# Patient Record
Sex: FEMALE | Race: WHITE | NOT HISPANIC OR LATINO | Employment: FULL TIME | RURAL
[De-identification: names, ages, dates, MRNs, and addresses within clinical notes are randomized per-mention and may not be internally consistent; named-entity substitution may affect disease eponyms.]

---

## 2018-02-06 ENCOUNTER — HISTORICAL (OUTPATIENT)
Dept: ADMINISTRATIVE | Facility: HOSPITAL | Age: 58
End: 2018-02-06

## 2018-02-08 LAB
LAB AP GENERAL CAT - HISTORICAL: NORMAL
LAB AP INTERPRETATION/RESULT - HISTORICAL: NEGATIVE
LAB AP SPECIMEN ADEQUACY - HISTORICAL: NORMAL
LAB AP SPECIMEN SUBMITTED - HISTORICAL: NORMAL

## 2019-04-02 ENCOUNTER — HISTORICAL (OUTPATIENT)
Dept: ADMINISTRATIVE | Facility: HOSPITAL | Age: 59
End: 2019-04-02

## 2023-10-02 ENCOUNTER — OFFICE VISIT (OUTPATIENT)
Dept: PULMONOLOGY | Facility: CLINIC | Age: 63
End: 2023-10-02
Payer: COMMERCIAL

## 2023-10-02 VITALS
HEART RATE: 89 BPM | BODY MASS INDEX: 34.15 KG/M2 | RESPIRATION RATE: 20 BRPM | SYSTOLIC BLOOD PRESSURE: 136 MMHG | DIASTOLIC BLOOD PRESSURE: 60 MMHG | OXYGEN SATURATION: 97 % | WEIGHT: 200 LBS | HEIGHT: 64 IN

## 2023-10-02 DIAGNOSIS — R59.0 MEDIASTINAL LYMPHADENOPATHY: Primary | ICD-10-CM

## 2023-10-02 DIAGNOSIS — C64.2 RENAL CELL CARCINOMA OF LEFT KIDNEY: ICD-10-CM

## 2023-10-02 PROBLEM — E55.9 VITAMIN D DEFICIENCY, UNSPECIFIED: Status: ACTIVE | Noted: 2023-10-02

## 2023-10-02 PROBLEM — E78.5 HYPERLIPIDEMIA LDL GOAL <100: Status: ACTIVE | Noted: 2023-10-02

## 2023-10-02 PROBLEM — C50.911 MALIGNANT NEOPLASM OF RIGHT FEMALE BREAST: Status: ACTIVE | Noted: 2023-10-02

## 2023-10-02 PROBLEM — I10 ESSENTIAL HYPERTENSION: Status: ACTIVE | Noted: 2023-10-02

## 2023-10-02 PROBLEM — E66.9 OBESITY: Status: ACTIVE | Noted: 2023-10-02

## 2023-10-02 PROCEDURE — 1160F PR REVIEW ALL MEDS BY PRESCRIBER/CLIN PHARMACIST DOCUMENTED: ICD-10-PCS | Mod: S$GLB,,, | Performed by: STUDENT IN AN ORGANIZED HEALTH CARE EDUCATION/TRAINING PROGRAM

## 2023-10-02 PROCEDURE — 99204 OFFICE O/P NEW MOD 45 MIN: CPT | Mod: S$GLB,,, | Performed by: STUDENT IN AN ORGANIZED HEALTH CARE EDUCATION/TRAINING PROGRAM

## 2023-10-02 PROCEDURE — 3078F PR MOST RECENT DIASTOLIC BLOOD PRESSURE < 80 MM HG: ICD-10-PCS | Mod: S$GLB,,, | Performed by: STUDENT IN AN ORGANIZED HEALTH CARE EDUCATION/TRAINING PROGRAM

## 2023-10-02 PROCEDURE — 3008F PR BODY MASS INDEX (BMI) DOCUMENTED: ICD-10-PCS | Mod: S$GLB,,, | Performed by: STUDENT IN AN ORGANIZED HEALTH CARE EDUCATION/TRAINING PROGRAM

## 2023-10-02 PROCEDURE — 1160F RVW MEDS BY RX/DR IN RCRD: CPT | Mod: S$GLB,,, | Performed by: STUDENT IN AN ORGANIZED HEALTH CARE EDUCATION/TRAINING PROGRAM

## 2023-10-02 PROCEDURE — 1159F PR MEDICATION LIST DOCUMENTED IN MEDICAL RECORD: ICD-10-PCS | Mod: S$GLB,,, | Performed by: STUDENT IN AN ORGANIZED HEALTH CARE EDUCATION/TRAINING PROGRAM

## 2023-10-02 PROCEDURE — 99204 PR OFFICE/OUTPT VISIT, NEW, LEVL IV, 45-59 MIN: ICD-10-PCS | Mod: S$GLB,,, | Performed by: STUDENT IN AN ORGANIZED HEALTH CARE EDUCATION/TRAINING PROGRAM

## 2023-10-02 PROCEDURE — 4010F ACE/ARB THERAPY RXD/TAKEN: CPT | Mod: S$GLB,,, | Performed by: STUDENT IN AN ORGANIZED HEALTH CARE EDUCATION/TRAINING PROGRAM

## 2023-10-02 PROCEDURE — 3075F SYST BP GE 130 - 139MM HG: CPT | Mod: S$GLB,,, | Performed by: STUDENT IN AN ORGANIZED HEALTH CARE EDUCATION/TRAINING PROGRAM

## 2023-10-02 PROCEDURE — 1159F MED LIST DOCD IN RCRD: CPT | Mod: S$GLB,,, | Performed by: STUDENT IN AN ORGANIZED HEALTH CARE EDUCATION/TRAINING PROGRAM

## 2023-10-02 PROCEDURE — 4010F PR ACE/ARB THEARPY RXD/TAKEN: ICD-10-PCS | Mod: S$GLB,,, | Performed by: STUDENT IN AN ORGANIZED HEALTH CARE EDUCATION/TRAINING PROGRAM

## 2023-10-02 PROCEDURE — 3008F BODY MASS INDEX DOCD: CPT | Mod: S$GLB,,, | Performed by: STUDENT IN AN ORGANIZED HEALTH CARE EDUCATION/TRAINING PROGRAM

## 2023-10-02 PROCEDURE — 99215 OFFICE O/P EST HI 40 MIN: CPT | Mod: PBBFAC | Performed by: STUDENT IN AN ORGANIZED HEALTH CARE EDUCATION/TRAINING PROGRAM

## 2023-10-02 PROCEDURE — 3078F DIAST BP <80 MM HG: CPT | Mod: S$GLB,,, | Performed by: STUDENT IN AN ORGANIZED HEALTH CARE EDUCATION/TRAINING PROGRAM

## 2023-10-02 PROCEDURE — 3075F PR MOST RECENT SYSTOLIC BLOOD PRESS GE 130-139MM HG: ICD-10-PCS | Mod: S$GLB,,, | Performed by: STUDENT IN AN ORGANIZED HEALTH CARE EDUCATION/TRAINING PROGRAM

## 2023-10-02 RX ORDER — AMLODIPINE AND BENAZEPRIL HYDROCHLORIDE 5; 20 MG/1; MG/1
1 CAPSULE ORAL
COMMUNITY
Start: 2023-07-27

## 2023-10-02 RX ORDER — ASPIRIN 81 MG/1
81 TABLET ORAL DAILY
COMMUNITY

## 2023-10-02 RX ORDER — EPINEPHRINE 0.22MG
AEROSOL WITH ADAPTER (ML) INHALATION
COMMUNITY

## 2023-10-02 RX ORDER — FOLIC ACID 0.8 MG
TABLET ORAL
COMMUNITY

## 2023-10-02 RX ORDER — FERROUS SULFATE 325(65) MG
TABLET ORAL
COMMUNITY

## 2023-10-02 RX ORDER — ACETAMINOPHEN 500 MG
TABLET ORAL
COMMUNITY

## 2023-10-02 RX ORDER — CALCIUM CARBONATE/VITAMIN D3 600 MG-20
TABLET,CHEWABLE ORAL
COMMUNITY

## 2023-10-02 RX ORDER — ROSUVASTATIN CALCIUM 10 MG/1
10 TABLET, COATED ORAL
COMMUNITY
Start: 2023-09-29

## 2023-10-02 NOTE — PATIENT INSTRUCTIONS
LAB TEST REQUESTED:  Please perform a CBC and BMP this week and fax the results to the clinic.     Check in on GROUND FLOOR of Ambulatory building.      Dont eat or drink  Follow any directions you are given for not eating or drinking before surgery. If you don't follow instructions about when to stop eating and drinking, your procedure may be postponed or rescheduled for another day. This is a safety issue.  You can brush your teeth and rinse your mouth, but dont swallow any water.    Day of surgery  Leave jewelry (including rings), watches, and other valuables at home.  Be sure to bring health insurance cards or forms, and a photo ID.  Bring a list of your medicines (include the name, dose, how often you take them, and the time last dose was taken).  You will be sedated for the procedure so you must have a  present with you.  Arrive on time at the hospital or surgery facility.        Flexible Bronchoscopy  A flexible bronchoscopy is an exam of the airways of your lungs. A thin, flexible tube called a bronchoscope is used. It has a light and small camera that allow the healthcare provider to view your airways.    Before your test  Follow your healthcare provider's instructions carefully. If you dont, the exam may be canceled. Or you may need to take it again.  If you are taking blood-thinning medicine, ask your healthcare provider if you should stop taking the medicine before this test.  Have no food or drink for at least 8 hours before the test. Also, avoid smoking for 24 hours before the test.  You will need to remove any dentures or removable devices from your mouth.  Right before the test, you will be given sedating medicines to help you relax. The medicine may be given by an IV (intravenously) into one of your veins. In addition, your nose and throat may be numbed with a special spray to help prevent gagging and coughing.  If you are having this test as an outpatient, make sure you have an adult  friend or family member to drive you home.    During your test  Bronchoscopy takes 45 to 60 minutes (**EBUS takes longer**) and includes the following steps:  You may be given medicine (anesthesia) so that you are unconscious or asleep during the procedure.  The healthcare provider inserts the tube into your nose or mouth.  If you have not been given anesthesia, you might feel a gagging sensation. To help ease this feeling, you will be told to swallow or take deep breaths. Your airway will remain open even with the tube in place. But you wont be able to talk.  The provider checks your breathing passage. He or she may also remove tiny tissue samples for biopsy.  After your test  You may have a mild sore throat or cough. Your voice may also be hoarse.  Don't eat or drink until the anesthesia wears off.  If you had a biopsy, you might see traces of blood being coughed up.  When to call your healthcare provider  Call your healthcare provider right away if you have any of the following:  Shortness of breath  Chest pain  Bleeding from your nose or throat  Coughing up a large amount of blood  A fever above 100.4°F (38°C) for more than 24 hours  Call 911  Call 911 if you have:  Chest pain  Severe shortness of breath

## 2023-10-02 NOTE — PROGRESS NOTES
[facility]  Pulmonology  NEW VISIT     Patient Name:  Xochilt Morel  Primary Care Provider: Levi Shook DO  Date of Service: 10/02/2023  Reason for Referral: Abnormal CT Chest      Chief Complaint: abnormal CT Chest    SUBJECTIVE   HPI:  Xochilt Morel is a 62 y.o. female with newly diagnosed RCC (L kidney) s/p resection 2023 with CT imaging demonstrating paratracheal, paraaortic and hilar lymphadenopathy who presents today upon referral with no acute complaints.     Adriel reports feeling well on this evaluation. She was recently diagnosed with renal cell cancer s/p resection. She has had no respiratory symptoms and is a lifelong non smoker. Her post-operative course was uncomplicated and she was able to return to work after 4 days post procedure. She denies fever, chills or cough. She has had TB screening with T spot testing (skin test) that has been negative.       Past Medical History:   Diagnosis Date    Essential (primary) hypertension     H/O right mastectomy 2011    High cholesterol     History of kidney cancer     removed 23    Renal cell carcinoma of left kidney 10/2/2023       Past Surgical History:   Procedure Laterality Date     SECTION Bilateral     x2       Family History   Problem Relation Age of Onset    Diabetes Other         Social History     Socioeconomic History    Marital status: Unknown   Tobacco Use    Smoking status: Never    Smokeless tobacco: Never   Substance and Sexual Activity    Alcohol use: Never    Drug use: Never    Sexual activity: Yes     Partners: Male   Social History Narrative    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       Review of patient's allergies indicates:   Allergen Reactions    Sulfa (sulfonamide antibiotics)      Other reaction(s): Rash, Itchy, Unknown        Medications:  "Medications reviewed to include over the counter medications.    Review of Systems: A 10 point ROS was completed and found to be negative except for that mentioned above.      OBJECTIVE   PHYSICAL EXAM:  Vitals:    10/02/23 1447   BP: 136/60   BP Location: Left arm   Patient Position: Sitting   BP Method: Large (Automatic)   Pulse: 89   Resp: 20   SpO2: 97%   Weight: 90.7 kg (200 lb)   Height: 5' 4" (1.626 m)        GENERAL: NAD  RESPIRATORY: clear to auscultation, no wheezing, rales or rhonchi  CARDIOVASCULAR: Regular rate and rhythm, no murmurs rubs or gallops. MUSCULOSKELETAL: No clubbing or cyanosis; no pedal edema  NEUROLOGIC: AO ×3, no gross deficits  PSYCH: Normal mood and affect    LABS:  Care Everywhere results reviewed 07/2023 CBC wnl (no anemia or thrombocytopenia), Scr 0.8 and no electrolyte derangements.    IMAGING:  Imaging reviewed and notable for CT Chest 09/01/2023 with pretracheal, subcarinal and L hilar lymphadenopathy and sub-centimeter nodules w/ solid component in LLL, R upper,middle and lower lung lobes.      LUNG FUNCTION TESTING: None available to review or report      ASSESSMENT & PLAN     1. Mediastinal lymphadenopathy  Assessment & Plan:  63 yo F with recent hx of RCC s/p resection (09/2023) and remote hx of breast cancer s/p R mastectomy presents upon referral by her Oncology care team (Dr. Magallanes) for CT Chest with mediastinal and hilar adenopathy along with sub-centimeter nodules. No ongoing respiratory symptoms. Lifelong non smoker. Ddx broad and most likely LAD 2/2 granulomatous disease; given oncologic history we discussed diagnosis with bronchoscopy with EBUS for biopsy of which she is amenable.   - plan for EBUS, pending scheduling; discussed procedure at length including risk of pneumothorax, bleeding and infection/pneumonia  - to obtain CBC and BMP and fax results to this facility  - ASA ok on day of procedure; not on any anticoagulation therapy    Orders:  -     EBUS; Future; " Expected date: 10/02/2023       Follow up for Pending scheduling of EBUS.      Case was discussed with patient and family members; all questions were answered to patient's satisfaction and patient verbalized understanding.     Kathy Lopez MD  Pulmonary Medicine  [facility]  Phone: [phone]

## 2023-10-02 NOTE — H&P (VIEW-ONLY)
[facility]  Pulmonology  NEW VISIT     Patient Name:  Xochilt Morel  Primary Care Provider: Levi Shook DO  Date of Service: 10/02/2023  Reason for Referral: Abnormal CT Chest      Chief Complaint: abnormal CT Chest    SUBJECTIVE   HPI:  Xochilt Morel is a 62 y.o. female with newly diagnosed RCC (L kidney) s/p resection 2023 with CT imaging demonstrating paratracheal, paraaortic and hilar lymphadenopathy who presents today upon referral with no acute complaints.     Adriel reports feeling well on this evaluation. She was recently diagnosed with renal cell cancer s/p resection. She has had no respiratory symptoms and is a lifelong non smoker. Her post-operative course was uncomplicated and she was able to return to work after 4 days post procedure. She denies fever, chills or cough. She has had TB screening with T spot testing (skin test) that has been negative.       Past Medical History:   Diagnosis Date    Essential (primary) hypertension     H/O right mastectomy 2011    High cholesterol     History of kidney cancer     removed 22    Renal cell carcinoma of left kidney 10/2/2023       Past Surgical History:   Procedure Laterality Date     SECTION Bilateral     x2       Family History   Problem Relation Age of Onset    Diabetes Other         Social History     Socioeconomic History    Marital status: Unknown   Tobacco Use    Smoking status: Never    Smokeless tobacco: Never   Substance and Sexual Activity    Alcohol use: Never    Drug use: Never    Sexual activity: Yes     Partners: Male   Social History Narrative    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       Review of patient's allergies indicates:   Allergen Reactions    Sulfa (sulfonamide antibiotics)      Other reaction(s): Rash, Itchy, Unknown        Medications:  "Medications reviewed to include over the counter medications.    Review of Systems: A 10 point ROS was completed and found to be negative except for that mentioned above.      OBJECTIVE   PHYSICAL EXAM:  Vitals:    10/02/23 1447   BP: 136/60   BP Location: Left arm   Patient Position: Sitting   BP Method: Large (Automatic)   Pulse: 89   Resp: 20   SpO2: 97%   Weight: 90.7 kg (200 lb)   Height: 5' 4" (1.626 m)        GENERAL: NAD  RESPIRATORY: clear to auscultation, no wheezing, rales or rhonchi  CARDIOVASCULAR: Regular rate and rhythm, no murmurs rubs or gallops. MUSCULOSKELETAL: No clubbing or cyanosis; no pedal edema  NEUROLOGIC: AO ×3, no gross deficits  PSYCH: Normal mood and affect    LABS:  Care Everywhere results reviewed 07/2023 CBC wnl (no anemia or thrombocytopenia), Scr 0.8 and no electrolyte derangements.    IMAGING:  Imaging reviewed and notable for CT Chest 09/01/2023 with pretracheal, subcarinal and L hilar lymphadenopathy and sub-centimeter nodules w/ solid component in LLL, R upper,middle and lower lung lobes.      LUNG FUNCTION TESTING: None available to review or report      ASSESSMENT & PLAN     1. Mediastinal lymphadenopathy  Assessment & Plan:  63 yo F with recent hx of RCC s/p resection (09/2023) and remote hx of breast cancer s/p R mastectomy presents upon referral by her Oncology care team (Dr. Magallanes) for CT Chest with mediastinal and hilar adenopathy along with sub-centimeter nodules. No ongoing respiratory symptoms. Lifelong non smoker. Ddx broad and most likely LAD 2/2 granulomatous disease; given oncologic history we discussed diagnosis with bronchoscopy with EBUS for biopsy of which she is amenable.   - plan for EBUS, pending scheduling; discussed procedure at length including risk of pneumothorax, bleeding and infection/pneumonia  - to obtain CBC and BMP and fax results to this facility  - ASA ok on day of procedure; not on any anticoagulation therapy    Orders:  -     EBUS; Future; " Expected date: 10/02/2023       Follow up for Pending scheduling of EBUS.      Case was discussed with patient and family members; all questions were answered to patient's satisfaction and patient verbalized understanding.     Kathy Lopez MD  Pulmonary Medicine  [facility]  Phone: [phone]

## 2023-10-03 NOTE — ASSESSMENT & PLAN NOTE
63 yo F with recent hx of RCC s/p resection (09/2023) and remote hx of breast cancer s/p R mastectomy presents upon referral by her Oncology care team (Dr. Magallanes) for CT Chest with mediastinal and hilar adenopathy along with sub-centimeter nodules. No ongoing respiratory symptoms. Lifelong non smoker. Ddx broad and most likely LAD 2/2 granulomatous disease; given oncologic history we discussed diagnosis with bronchoscopy with EBUS for biopsy of which she is amenable.   - plan for EBUS, pending scheduling; discussed procedure at length including risk of pneumothorax, bleeding and infection/pneumonia  - to obtain CBC and BMP and fax results to this facility  - ASA ok on day of procedure; not on any anticoagulation therapy

## 2023-10-06 ENCOUNTER — TELEPHONE (OUTPATIENT)
Dept: PULMONOLOGY | Facility: CLINIC | Age: 63
End: 2023-10-06
Payer: COMMERCIAL

## 2023-10-06 NOTE — TELEPHONE ENCOUNTER
----- Message from Liza Murillo sent at 10/6/2023  8:39 AM CDT -----    Patient Returning Call        Who Called:pt  Does the patient know what this is regarding?:pt need lab order faxed to [phone] so she can get labs today pt work at a doctors office please have nurse to call if any questions  Would the patient rather a call back or a response via [facility]? call  Best Call Back Number:[phone] fax lab orders to pt   Additional Information: call back

## 2023-10-09 ENCOUNTER — HOSPITAL ENCOUNTER (OUTPATIENT)
Dept: RADIOLOGY | Facility: HOSPITAL | Age: 63
Discharge: HOME | End: 2023-10-09
Attending: STUDENT IN AN ORGANIZED HEALTH CARE EDUCATION/TRAINING PROGRAM
Payer: COMMERCIAL

## 2023-10-09 ENCOUNTER — HOSPITAL ENCOUNTER (OUTPATIENT)
Dept: GASTROENTEROLOGY | Facility: HOSPITAL | Age: 63
Discharge: HOME | End: 2023-10-09
Attending: STUDENT IN AN ORGANIZED HEALTH CARE EDUCATION/TRAINING PROGRAM
Payer: COMMERCIAL

## 2023-10-09 ENCOUNTER — ANESTHESIA (OUTPATIENT)
Dept: GASTROENTEROLOGY | Facility: HOSPITAL | Age: 63
End: 2023-10-09
Payer: COMMERCIAL

## 2023-10-09 ENCOUNTER — ANESTHESIA EVENT (OUTPATIENT)
Dept: GASTROENTEROLOGY | Facility: HOSPITAL | Age: 63
End: 2023-10-09
Payer: COMMERCIAL

## 2023-10-09 VITALS
OXYGEN SATURATION: 97 % | RESPIRATION RATE: 16 BRPM | DIASTOLIC BLOOD PRESSURE: 70 MMHG | TEMPERATURE: 98 F | SYSTOLIC BLOOD PRESSURE: 136 MMHG | HEART RATE: 86 BPM

## 2023-10-09 DIAGNOSIS — I10 HYPERTENSION: ICD-10-CM

## 2023-10-09 DIAGNOSIS — R59.0 MEDIASTINAL LYMPHADENOPATHY: ICD-10-CM

## 2023-10-09 LAB
AFB SMEAR (FA): NORMAL
DIRECT PREP: NORMAL

## 2023-10-09 PROCEDURE — 87210 SMEAR WET MOUNT SALINE/INK: CPT | Performed by: STUDENT IN AN ORGANIZED HEALTH CARE EDUCATION/TRAINING PROGRAM

## 2023-10-09 PROCEDURE — 31653 BRONCH EBUS SAMPLNG 3/> NODE: CPT

## 2023-10-09 PROCEDURE — 88172 CYTOLOGY, FNA: ICD-10-PCS | Mod: 26,,, | Performed by: PATHOLOGY

## 2023-10-09 PROCEDURE — 27201423 OPTIME MED/SURG SUP & DEVICES STERILE SUPPLY

## 2023-10-09 PROCEDURE — 31624 DX BRONCHOSCOPE/LAVAGE: CPT

## 2023-10-09 PROCEDURE — 27201480 HC GLIDESCOPE: Performed by: ANESTHESIOLOGY

## 2023-10-09 PROCEDURE — 37000009 HC ANESTHESIA EA ADD 15 MINS

## 2023-10-09 PROCEDURE — 88312 SPECIAL STAINS GROUP 1: CPT | Mod: 26,,, | Performed by: PATHOLOGY

## 2023-10-09 PROCEDURE — 88172 CYTP DX EVAL FNA 1ST EA SITE: CPT | Mod: 26,,, | Performed by: PATHOLOGY

## 2023-10-09 PROCEDURE — 31624 PR BRONCHOSCOPY,DIAG2STIC W LAVAGE: ICD-10-PCS | Mod: 59,RT,ICN, | Performed by: STUDENT IN AN ORGANIZED HEALTH CARE EDUCATION/TRAINING PROGRAM

## 2023-10-09 PROCEDURE — 27000655: Performed by: ANESTHESIOLOGY

## 2023-10-09 PROCEDURE — 88305 CYTOLOGY, FNA: ICD-10-PCS | Mod: 26,,, | Performed by: PATHOLOGY

## 2023-10-09 PROCEDURE — 31645 PR BRONCHOSCOPY,RX ASPIR PULM TREE: ICD-10-PCS | Mod: ICN,,, | Performed by: STUDENT IN AN ORGANIZED HEALTH CARE EDUCATION/TRAINING PROGRAM

## 2023-10-09 PROCEDURE — 37000008 HC ANESTHESIA 1ST 15 MINUTES

## 2023-10-09 PROCEDURE — 31622 DX BRONCHOSCOPE/WASH: CPT

## 2023-10-09 PROCEDURE — 87102 FUNGUS ISOLATION CULTURE: CPT | Performed by: STUDENT IN AN ORGANIZED HEALTH CARE EDUCATION/TRAINING PROGRAM

## 2023-10-09 PROCEDURE — 88108 CYTOPATH CONCENTRATE TECH: CPT | Mod: 26,,, | Performed by: PATHOLOGY

## 2023-10-09 PROCEDURE — D9220A PRA ANESTHESIA: Mod: ANES,,, | Performed by: ANESTHESIOLOGY

## 2023-10-09 PROCEDURE — 71045 X-RAY EXAM CHEST 1 VIEW: CPT | Mod: TC

## 2023-10-09 PROCEDURE — 93010 ELECTROCARDIOGRAM REPORT: CPT | Mod: ,,, | Performed by: HOSPITALIST

## 2023-10-09 PROCEDURE — 88305 TISSUE EXAM BY PATHOLOGIST: CPT | Mod: 26,,, | Performed by: PATHOLOGY

## 2023-10-09 PROCEDURE — D9220A PRA ANESTHESIA: ICD-10-PCS | Mod: CRNA,,, | Performed by: NURSE ANESTHETIST, CERTIFIED REGISTERED

## 2023-10-09 PROCEDURE — 27000689 HC BLADE LARYNGOSCOPE ANY SIZE: Performed by: ANESTHESIOLOGY

## 2023-10-09 PROCEDURE — 31624 DX BRONCHOSCOPE/LAVAGE: CPT | Mod: 51,,, | Performed by: STUDENT IN AN ORGANIZED HEALTH CARE EDUCATION/TRAINING PROGRAM

## 2023-10-09 PROCEDURE — D9220A PRA ANESTHESIA: ICD-10-PCS | Mod: ANES,,, | Performed by: ANESTHESIOLOGY

## 2023-10-09 PROCEDURE — 31653 BRONCH EBUS SAMPLNG 3/> NODE: CPT | Mod: ,,, | Performed by: STUDENT IN AN ORGANIZED HEALTH CARE EDUCATION/TRAINING PROGRAM

## 2023-10-09 PROCEDURE — 27000716 HC OXISENSOR PROBE, ANY SIZE: Performed by: ANESTHESIOLOGY

## 2023-10-09 PROCEDURE — 87107 FUNGI IDENTIFICATION MOLD: CPT | Mod: 90 | Performed by: STUDENT IN AN ORGANIZED HEALTH CARE EDUCATION/TRAINING PROGRAM

## 2023-10-09 PROCEDURE — 71045 X-RAY EXAM CHEST 1 VIEW: CPT | Mod: 26,,, | Performed by: RADIOLOGY

## 2023-10-09 PROCEDURE — 87070 CULTURE OTHR SPECIMN AEROBIC: CPT | Performed by: STUDENT IN AN ORGANIZED HEALTH CARE EDUCATION/TRAINING PROGRAM

## 2023-10-09 PROCEDURE — 87206 SMEAR FLUORESCENT/ACID STAI: CPT | Performed by: STUDENT IN AN ORGANIZED HEALTH CARE EDUCATION/TRAINING PROGRAM

## 2023-10-09 PROCEDURE — 93010 EKG 12-LEAD: ICD-10-PCS | Mod: ,,, | Performed by: HOSPITALIST

## 2023-10-09 PROCEDURE — 63600175 PHARM REV CODE 636 W HCPCS: Performed by: NURSE ANESTHETIST, CERTIFIED REGISTERED

## 2023-10-09 PROCEDURE — 25000003 PHARM REV CODE 250: Performed by: NURSE ANESTHETIST, CERTIFIED REGISTERED

## 2023-10-09 PROCEDURE — 71045 XR CHEST AP PORTABLE: ICD-10-PCS | Mod: 26,,, | Performed by: RADIOLOGY

## 2023-10-09 PROCEDURE — 27000510 HC BLANKET BAIR HUGGER ANY SIZE: Performed by: ANESTHESIOLOGY

## 2023-10-09 PROCEDURE — 93005 ELECTROCARDIOGRAM TRACING: CPT

## 2023-10-09 PROCEDURE — 87116 MYCOBACTERIA CULTURE: CPT | Performed by: STUDENT IN AN ORGANIZED HEALTH CARE EDUCATION/TRAINING PROGRAM

## 2023-10-09 PROCEDURE — 31645 BRNCHSC W/THER ASPIR 1ST: CPT

## 2023-10-09 PROCEDURE — 31645 BRNCHSC W/THER ASPIR 1ST: CPT | Mod: ,,, | Performed by: STUDENT IN AN ORGANIZED HEALTH CARE EDUCATION/TRAINING PROGRAM

## 2023-10-09 PROCEDURE — 88108 NON-GYN CYTOLOGY: ICD-10-PCS | Mod: 26,,, | Performed by: PATHOLOGY

## 2023-10-09 PROCEDURE — 87205 SMEAR GRAM STAIN: CPT | Performed by: STUDENT IN AN ORGANIZED HEALTH CARE EDUCATION/TRAINING PROGRAM

## 2023-10-09 PROCEDURE — 88305 TISSUE EXAM BY PATHOLOGIST: CPT | Mod: TC,MCY | Performed by: STUDENT IN AN ORGANIZED HEALTH CARE EDUCATION/TRAINING PROGRAM

## 2023-10-09 PROCEDURE — 31653 PR BRONCH W/ EBUS, SAMPLING 3 OR MORE NODES, INCL GUIDE: ICD-10-PCS | Mod: ICN,,, | Performed by: STUDENT IN AN ORGANIZED HEALTH CARE EDUCATION/TRAINING PROGRAM

## 2023-10-09 PROCEDURE — 88312 CYTOLOGY, FNA: ICD-10-PCS | Mod: 26,,, | Performed by: PATHOLOGY

## 2023-10-09 PROCEDURE — 27000165 HC TUBE, ETT CUFFED: Performed by: ANESTHESIOLOGY

## 2023-10-09 PROCEDURE — D9220A PRA ANESTHESIA: Mod: CRNA,,, | Performed by: NURSE ANESTHETIST, CERTIFIED REGISTERED

## 2023-10-09 RX ORDER — MEPERIDINE HYDROCHLORIDE 25 MG/ML
25 INJECTION INTRAMUSCULAR; INTRAVENOUS; SUBCUTANEOUS EVERY 10 MIN PRN
Status: DISCONTINUED | OUTPATIENT
Start: 2023-10-09 | End: 2023-10-09 | Stop reason: HOSPADM

## 2023-10-09 RX ORDER — PHENYLEPHRINE HYDROCHLORIDE 10 MG/ML
INJECTION INTRAVENOUS
Status: DISCONTINUED | OUTPATIENT
Start: 2023-10-09 | End: 2023-10-09

## 2023-10-09 RX ORDER — SODIUM CHLORIDE 9 MG/ML
INJECTION, SOLUTION INTRAVENOUS
Status: COMPLETED
Start: 2023-10-09 | End: 2023-10-09

## 2023-10-09 RX ORDER — IPRATROPIUM BROMIDE AND ALBUTEROL SULFATE 2.5; .5 MG/3ML; MG/3ML
3 SOLUTION RESPIRATORY (INHALATION) ONCE
Status: DISCONTINUED | OUTPATIENT
Start: 2023-10-09 | End: 2023-10-10 | Stop reason: HOSPADM

## 2023-10-09 RX ORDER — ROCURONIUM BROMIDE 10 MG/ML
INJECTION, SOLUTION INTRAVENOUS
Status: DISCONTINUED | OUTPATIENT
Start: 2023-10-09 | End: 2023-10-09

## 2023-10-09 RX ORDER — ONDANSETRON 2 MG/ML
4 INJECTION INTRAMUSCULAR; INTRAVENOUS DAILY PRN
Status: DISCONTINUED | OUTPATIENT
Start: 2023-10-09 | End: 2023-10-10 | Stop reason: HOSPADM

## 2023-10-09 RX ORDER — HYDROMORPHONE HYDROCHLORIDE 2 MG/ML
0.5 INJECTION, SOLUTION INTRAMUSCULAR; INTRAVENOUS; SUBCUTANEOUS EVERY 5 MIN PRN
Status: DISCONTINUED | OUTPATIENT
Start: 2023-10-09 | End: 2023-10-10 | Stop reason: HOSPADM

## 2023-10-09 RX ORDER — MIDAZOLAM HYDROCHLORIDE 1 MG/ML
INJECTION INTRAMUSCULAR; INTRAVENOUS
Status: DISCONTINUED | OUTPATIENT
Start: 2023-10-09 | End: 2023-10-09

## 2023-10-09 RX ORDER — FENTANYL CITRATE 50 UG/ML
INJECTION, SOLUTION INTRAMUSCULAR; INTRAVENOUS
Status: DISCONTINUED | OUTPATIENT
Start: 2023-10-09 | End: 2023-10-09

## 2023-10-09 RX ORDER — DIPHENHYDRAMINE HYDROCHLORIDE 50 MG/ML
25 INJECTION INTRAMUSCULAR; INTRAVENOUS EVERY 6 HOURS PRN
Status: DISCONTINUED | OUTPATIENT
Start: 2023-10-09 | End: 2023-10-10 | Stop reason: HOSPADM

## 2023-10-09 RX ORDER — PROPOFOL 10 MG/ML
VIAL (ML) INTRAVENOUS
Status: DISCONTINUED | OUTPATIENT
Start: 2023-10-09 | End: 2023-10-09

## 2023-10-09 RX ORDER — SCOLOPAMINE TRANSDERMAL SYSTEM 1 MG/1
PATCH, EXTENDED RELEASE TRANSDERMAL
Status: DISCONTINUED | OUTPATIENT
Start: 2023-10-09 | End: 2023-10-09

## 2023-10-09 RX ORDER — ONDANSETRON 2 MG/ML
INJECTION INTRAMUSCULAR; INTRAVENOUS
Status: DISCONTINUED | OUTPATIENT
Start: 2023-10-09 | End: 2023-10-09

## 2023-10-09 RX ORDER — MORPHINE SULFATE 10 MG/ML
4 INJECTION INTRAMUSCULAR; INTRAVENOUS; SUBCUTANEOUS EVERY 5 MIN PRN
Status: DISCONTINUED | OUTPATIENT
Start: 2023-10-09 | End: 2023-10-10 | Stop reason: HOSPADM

## 2023-10-09 RX ORDER — SODIUM CHLORIDE 9 MG/ML
INJECTION, SOLUTION INTRAVENOUS CONTINUOUS
Status: DISCONTINUED | OUTPATIENT
Start: 2023-10-09 | End: 2023-10-10 | Stop reason: HOSPADM

## 2023-10-09 RX ORDER — LIDOCAINE HYDROCHLORIDE 20 MG/ML
INJECTION, SOLUTION EPIDURAL; INFILTRATION; INTRACAUDAL; PERINEURAL
Status: DISCONTINUED | OUTPATIENT
Start: 2023-10-09 | End: 2023-10-09

## 2023-10-09 RX ORDER — DEXAMETHASONE SODIUM PHOSPHATE 4 MG/ML
INJECTION, SOLUTION INTRA-ARTICULAR; INTRALESIONAL; INTRAMUSCULAR; INTRAVENOUS; SOFT TISSUE
Status: DISCONTINUED | OUTPATIENT
Start: 2023-10-09 | End: 2023-10-09

## 2023-10-09 RX ADMIN — SUGAMMADEX 200 MG: 100 INJECTION, SOLUTION INTRAVENOUS at 09:10

## 2023-10-09 RX ADMIN — PHENYLEPHRINE HYDROCHLORIDE 100 MCG: 10 INJECTION INTRAVENOUS at 08:10

## 2023-10-09 RX ADMIN — FENTANYL CITRATE 100 MCG: 50 INJECTION INTRAMUSCULAR; INTRAVENOUS at 07:10

## 2023-10-09 RX ADMIN — ONDANSETRON 4 MG: 2 INJECTION INTRAMUSCULAR; INTRAVENOUS at 08:10

## 2023-10-09 RX ADMIN — LIDOCAINE HYDROCHLORIDE 5 MG: 20 INJECTION, SOLUTION INTRAVENOUS at 07:10

## 2023-10-09 RX ADMIN — PROPOFOL 160 MG: 10 INJECTION, EMULSION INTRAVENOUS at 07:10

## 2023-10-09 RX ADMIN — MIDAZOLAM HYDROCHLORIDE 2 MG: 1 INJECTION, SOLUTION INTRAMUSCULAR; INTRAVENOUS at 07:10

## 2023-10-09 RX ADMIN — ROCURONIUM BROMIDE 50 MG: 10 INJECTION, SOLUTION INTRAVENOUS at 07:10

## 2023-10-09 RX ADMIN — DEXAMETHASONE SODIUM PHOSPHATE 8 MG: 4 INJECTION, SOLUTION INTRAMUSCULAR; INTRAVENOUS at 08:10

## 2023-10-09 RX ADMIN — SODIUM CHLORIDE: 9 INJECTION, SOLUTION INTRAVENOUS at 07:10

## 2023-10-09 RX ADMIN — PHENYLEPHRINE HYDROCHLORIDE 200 MCG: 10 INJECTION INTRAVENOUS at 08:10

## 2023-10-09 RX ADMIN — SCOPALAMINE 1 PATCH: 1 PATCH, EXTENDED RELEASE TRANSDERMAL at 07:10

## 2023-10-09 RX ADMIN — PHENYLEPHRINE HYDROCHLORIDE 100 MCG: 10 INJECTION INTRAVENOUS at 09:10

## 2023-10-09 RX ADMIN — SODIUM CHLORIDE, POTASSIUM CHLORIDE, SODIUM LACTATE AND CALCIUM CHLORIDE: 600; 310; 30; 20 INJECTION, SOLUTION INTRAVENOUS at 08:10

## 2023-10-09 NOTE — OR NURSING
0935 REC'D TO PACU SLIGHTLY DROWSY IN STABLE CONDITION. VSS. SATS 100% ON 6L/FM. DENIES PAIN AT THIS TIME. SCOPE PATCH NOTED BEHIND R EAR. WILL CONT TO MONITOR.     0961 UPDATE GIVEN TO  VIA PHONE CALL TO [facility].    1010 RETURNED TO [facility] AWAKE IN STABLE CONDITION. /71  HR 83 SATS 100% ON RA.  AT BEDSIDE. REPORT GIVEN TO BERTHA PORRAS RN.

## 2023-10-09 NOTE — INTERVAL H&P NOTE
The patient has been examined and the H&P has been reviewed:    I concur with the findings and no changes have occurred since H&P was written.      61 yo F lifelong non smoker with recent hx of RCC s/p resection (09/2023) and remote hx of breast cancer s/p R mastectomy was found on CT Chest to have mediastinal and hilar adenopathy along with sub-centimeter lung nodules presenting for bronchocopy with EBUS and biospy.   Labs completed since last visit and personally reviewed and scanned to media: SCr 1.1 and CBC wnl with peripheral eosinophilia.  ASA 2          # Mediastinal lymphadenopathy  # Hilar Lymphadenopathy  # Pulmonary nodules

## 2023-10-09 NOTE — ANESTHESIA PREPROCEDURE EVALUATION
"                                                                                                             10/09/2023  Xochilt Morel is a 62 y.o., female.      Pre-op Assessment    I have reviewed the Patient Summary Reports.     I have reviewed the Nursing Notes. I have reviewed the NPO Status.   I have reviewed the Medications.     Review of Systems  Anesthesia Hx:  Denies Family Hx of Anesthesia complications.   Denies Personal Hx of Anesthesia complications.   Social:  Non-Smoker, No Alcohol Use    Hematology/Oncology:  Hematology Normal   Oncology Normal     EENT/Dental:EENT/Dental Normal   Cardiovascular:   Hypertension hyperlipidemia    Pulmonary:   Mediastinal lymphadenopathy   Renal/:   Chronic Renal Disease  Neoplasm/Tumor, Renal Neoplasm, Renal Cell Carcinoma (CA).    Hepatic/GI:  Hepatic/GI Normal    Musculoskeletal:  Musculoskeletal Normal    Neurological:  Neurology Normal    Endocrine:  Endocrine Normal  Obesity / BMI > 30  Dermatological:  Skin Normal    Psych:  Psychiatric Normal           Physical Exam  General: Well nourished, Cooperative, Alert and Oriented    Airway:  Mallampati: II / II  Mouth Opening: Normal  TM Distance: Normal  Neck ROM: Normal ROM    Dental:  Intact    Chest/Lungs:  Clear to auscultation    Heart:  Rate: Normal  Rhythm: Regular Rhythm  Sounds: Normal        Chemistry    No results found for: "NA", "K", "CL", "CO2", "BUN", "CREATININE", "GLU" No results found for: "CALCIUM", "ALKPHOS", "AST", "ALT", "BILITOT", "ESTGFRAFRICA", "EGFRNONAA"     No results found for: "WBC", "HGB", "HCT", "PLT"  No results found for this or any previous visit.        Anesthesia Plan  Type of Anesthesia, risks & benefits discussed:    Anesthesia Type: Gen ETT  Intra-op Monitoring Plan: Standard ASA Monitors  Post Op Pain Control Plan: multimodal analgesia  Induction:  IV  Airway Plan: Direct  Informed Consent: Informed consent signed with the Patient and all parties understand the risks and " agree with anesthesia plan.  All questions answered.   ASA Score: 3  Day of Surgery Review of History & Physical: H&P Update referred to the surgeon/provider.    Ready For Surgery From Anesthesia Perspective.     .

## 2023-10-09 NOTE — ANESTHESIA PROCEDURE NOTES
Intubation    Date/Time: 10/9/2023 7:53 AM    Performed by: Mikel Salas CRNA  Authorized by: Wm Rowan MD    Intubation:     Induction:  Intravenous    Intubated:  Postinduction    Mask Ventilation:  Easy mask    Attempts:  2    Attempted By:  CRNA    Method of Intubation:  Direct    Blade:  Macedo 2    Laryngeal View Grade: Grade III - only epiglottis visible      Attempted By (2nd Attempt):  CRNA    Method of Intubation (2nd Attempt):  Video laryngoscopy    Blade (2nd Attempt):  Glidescope 3    Laryngeal View Grade (2nd Attempt): Grade I - full view of cords      Difficult Airway Encountered?: No      Complications:  None    Airway Device:  Oral endotracheal tube    Airway Device Size:  8.5    Style/Cuff Inflation:  Cuffed (inflated to minimal occlusive pressure)    Inflation Amount (mL):  8    Tube secured:  21    Secured at:  The lips    Placement Verified By:  Capnometry    Complicating Factors:  None    Findings Post-Intubation:  BS equal bilateral

## 2023-10-09 NOTE — DISCHARGE INSTRUCTIONS
POST BRONCHOSCOPY DISCHARGE INSTRUCTIONS:  Today you have undergone a procedure called a bronchoscopy with biopsy (also referred to as Bronchoscopy with EBUS and biopsy). You underwent general anesthesia and the medication will be in your body for the following hours up to 24 hours. It is essential that you are accompanied home by a responsible adult and I recommend that they stay with you during this period. You should NOT drive a vehicle, operate any form of machinery, consume alcohol or sign legal documentation during this time. After 24 hours, the effect of sedation should have worn off and you will be able to start normal activities.      DIET: You may begin a normal diet and fluids 2 HOURS following leaving the hospital. This will make sure your swallowing muscles have recovered properly.      MEDICATIONS: You may resume your usual prescriptions tomorrow.      BIOPSIES AND SPECIMENS: The biopsies that were taken following your procedure have been taken for processing and testing. It may take up to 1 week, and sometimes longer, to get the final result. You will receive a call from my office to schedule a follow up to discuss the results. If you prefer, I can also call your with the results once I have received and reviewed them.      SYMPTOMS:  You may have a sore throat after the procedure; this typically resolves in a day.  Because of the biopsies taken, you may experience a low grade fever for <24 hrs and coughing with some blood coming up.  You may cough after surgery. This is normal to clear secretions in your lung that collected during the time  you were asleep in surgery and could not cough on your own. You may also see some blood in your sputum.  This is normal and as long as it is only a small amount there is no need for alarm.      Awareness of the following unusual symptoms should prompt you to call our office at [phone] to discuss a plan for management. If the symptoms come on suddenly go to  the Emergency Department for evaluation.  These unusual symptoms include:  Sudden breathing distress, such as being more breathless than you normally are  Chest pain not responding to medication  Persistently high temperature or fever of 100.4°F or higher  Coughing up large amount of blood or blood clots (more than a teaspoon)   Sudden swelling of your previous IV sites      It was my honor to be your doctor to perform this diagnostic procedure.     Kathy Lopez MD  Pulmonary and Critical Care  [facility]

## 2023-10-09 NOTE — TRANSFER OF CARE
Anesthesia Transfer of Care Note    Patient: Xochilt Morel    Procedure(s) Performed: * EBUS *    Patient location: PACU    Anesthesia Type: general    Transport from OR: Transported from OR on 6-10 L/min O2 by face mask with adequate spontaneous ventilation    Post pain: adequate analgesia    Post assessment: no apparent anesthetic complications    Post vital signs: stable    Level of consciousness: responds to stimulation and awake    Nausea/Vomiting: no nausea/vomiting    Complications: none    Transfer of care protocol was followedComments: Good SV continue, NAD noted, VSS, RTRN      Last vitals:   Visit Vitals  /65   Pulse 93   Temp 36.6 °C (97.8 °F)   Resp 14   LMP  (LMP Unknown)   SpO2 100%   Breastfeeding No

## 2023-10-09 NOTE — DISCHARGE SUMMARY
[facility] - Endoscopy  Discharge Note  Short Stay    EBUS    Xochilt Morel   61 yo F lifelong non smoker with recent hx of RCC s/p resection (09/2023) and remote hx of breast cancer s/p R mastectomy was found on CT Chest to have mediastinal and hilar adenopathy along with sub-centimeter lung nodules presents 10/09/2023 in outpatient setting for planned Bronchoscopy with Biopsy.    PROCEDURES: Bronchoscopy with Endobronchial ultrasound (EBUS), Transbronchial needle aspiration of 3 or more lymph nodes or structures, and Bronchoalveolar lavage  See procedure note for further details.    OUTCOME: Patient tolerated the procedure well without complication and is now ready for discharge    DISPOSITION: Home or self care    FINAL DIAGNOSIS: Mediastinal lymphadenopathy. Hilar lymphadenopathy. Cobblestoning of airway.    FOLLOW UP: Patient requests notification of results over the phone. Will coordinate follow up vs referral thereafter.     DISCHARGE INSTRUCTIONS: Post-bronchoscopy instructions discussed with patient. Entered into AVS.     TIME SPENT ON DISCHARGE: <30 minutes      Discussed with patient and accompanying family management plans, all questions were answered and they verbalized understanding.     Kathy Lopez MD  Pulmonary and Critical Care  [facility]

## 2023-10-09 NOTE — PLAN OF CARE
TO OP ROOM 5 VIA STRETCHER FROM PACU. EYES CLOSED BUT EASILY AROUSED. RESP EVEN AND NONLABORED.  AT BEDSIDE.

## 2023-10-10 ENCOUNTER — TELEPHONE (OUTPATIENT)
Dept: PULMONOLOGY | Facility: CLINIC | Age: 63
End: 2023-10-10
Payer: COMMERCIAL

## 2023-10-10 DIAGNOSIS — D86.0 SARCOIDOSIS OF LUNG: ICD-10-CM

## 2023-10-10 LAB
ESTROGEN SERPL-MCNC: NORMAL PG/ML
ESTROGEN SERPL-MCNC: NORMAL PG/ML
INSULIN SERPL-ACNC: NORMAL U[IU]/ML
LAB AP COMMENTS: NORMAL
LAB AP GROSS DESCRIPTION: NORMAL
LAB AP LABORATORY NOTES: NORMAL
LAB AP SPECIMEN A NON-GYN GENERAL CATEGORIZATION: NEGATIVE
LAB AP SPECIMEN A NON-GYN INTERPETATION: NORMAL
RUSH AP QUICK STAIN DIAGNOSIS: NORMAL
RUSH AP QUICK STAIN DIAGNOSIS: NORMAL
T3RU NFR SERPL: NORMAL %
T3RU NFR SERPL: NORMAL %

## 2023-10-10 NOTE — ANESTHESIA POSTPROCEDURE EVALUATION
Anesthesia Post Evaluation    Patient: Xochilt Morel    Procedure(s) Performed: * No procedures listed *    Final Anesthesia Type: general      Patient location during evaluation: PACU  Patient participation: Yes- Able to Participate  Level of consciousness: awake and alert  Post-procedure vital signs: reviewed and stable  Pain management: adequate  Airway patency: patent  PENNY mitigation strategies: Multimodal analgesia  PONV status at discharge: No PONV  Anesthetic complications: no      Cardiovascular status: blood pressure returned to baseline  Respiratory status: unassisted  Hydration status: euvolemic  Follow-up not needed.          Vitals Value Taken Time   /70 10/09/23 1031   Temp 36.6 °C (97.8 °F) 10/09/23 0939   Pulse 83 10/09/23 1039   Resp 16 10/09/23 1030   SpO2 97 % 10/09/23 1039   Vitals shown include unvalidated device data.      Event Time   Out of Recovery 10:05:00         Pain/Jada Score: Jada Score: 10 (10/9/2023 11:05 AM)

## 2023-10-11 LAB
CULTURE, LOWER RESPIRATORY: NORMAL
GRAM STN SPEC: NORMAL

## 2023-10-12 PROBLEM — D86.0 SARCOIDOSIS OF LUNG: Status: ACTIVE | Noted: 2023-10-12

## 2023-10-12 NOTE — TELEPHONE ENCOUNTER
Called patient and discussed results of cytology following EBUS 10/09.  Discussion was held in conjunction with her oncologist.    Findings are consistent with sarcoidosis.  She has no respiratory symptoms and her postprocedure course was uneventful.  She is being considered for adjuvant therapy with Keytruda and we discussed known sarcoid like reaction to this medication.  In event that immunotherapy or chemotherapy is to be pursued, she will present for lung function testing to determine baseline.  Seeing as her sarcoidosis is asymptomatic, will hold on chronic therapy.  She was advised to present to pulmonology in the event that she develops new respiratory symptoms.    All questions were answered to patient's satisfaction and she verbalized understanding.       Kathy Lopez MD  Pulmonary and Critical Care  [facility]

## 2023-10-24 ENCOUNTER — PATIENT MESSAGE (OUTPATIENT)
Dept: PULMONOLOGY | Facility: CLINIC | Age: 63
End: 2023-10-24
Payer: COMMERCIAL

## 2023-10-24 ENCOUNTER — CLINICAL SUPPORT (OUTPATIENT)
Dept: PULMONOLOGY | Facility: HOSPITAL | Age: 63
End: 2023-10-24
Attending: STUDENT IN AN ORGANIZED HEALTH CARE EDUCATION/TRAINING PROGRAM
Payer: COMMERCIAL

## 2023-10-24 ENCOUNTER — OFFICE VISIT (OUTPATIENT)
Dept: PULMONOLOGY | Facility: CLINIC | Age: 63
End: 2023-10-24
Payer: COMMERCIAL

## 2023-10-24 ENCOUNTER — TELEPHONE (OUTPATIENT)
Dept: PULMONOLOGY | Facility: CLINIC | Age: 63
End: 2023-10-24
Payer: COMMERCIAL

## 2023-10-24 VITALS
DIASTOLIC BLOOD PRESSURE: 76 MMHG | HEART RATE: 85 BPM | OXYGEN SATURATION: 97 % | SYSTOLIC BLOOD PRESSURE: 128 MMHG | HEIGHT: 64 IN | WEIGHT: 200 LBS | BODY MASS INDEX: 34.15 KG/M2 | RESPIRATION RATE: 16 BRPM

## 2023-10-24 VITALS — OXYGEN SATURATION: 97 %

## 2023-10-24 DIAGNOSIS — D86.9 SARCOIDOSIS: ICD-10-CM

## 2023-10-24 DIAGNOSIS — Z91.89: Primary | ICD-10-CM

## 2023-10-24 DIAGNOSIS — D86.0 SARCOIDOSIS OF LUNG: ICD-10-CM

## 2023-10-24 DIAGNOSIS — D86.9 SARCOIDOSIS: Primary | ICD-10-CM

## 2023-10-24 PROCEDURE — 94729 PR C02/MEMBANE DIFFUSE CAPACITY: ICD-10-PCS | Mod: 26,,, | Performed by: STUDENT IN AN ORGANIZED HEALTH CARE EDUCATION/TRAINING PROGRAM

## 2023-10-24 PROCEDURE — 99213 OFFICE O/P EST LOW 20 MIN: CPT | Mod: 25,S$GLB,, | Performed by: STUDENT IN AN ORGANIZED HEALTH CARE EDUCATION/TRAINING PROGRAM

## 2023-10-24 PROCEDURE — 3008F PR BODY MASS INDEX (BMI) DOCUMENTED: ICD-10-PCS | Mod: S$GLB,,, | Performed by: STUDENT IN AN ORGANIZED HEALTH CARE EDUCATION/TRAINING PROGRAM

## 2023-10-24 PROCEDURE — 1159F MED LIST DOCD IN RCRD: CPT | Mod: S$GLB,,, | Performed by: STUDENT IN AN ORGANIZED HEALTH CARE EDUCATION/TRAINING PROGRAM

## 2023-10-24 PROCEDURE — 3008F BODY MASS INDEX DOCD: CPT | Mod: S$GLB,,, | Performed by: STUDENT IN AN ORGANIZED HEALTH CARE EDUCATION/TRAINING PROGRAM

## 2023-10-24 PROCEDURE — 3074F SYST BP LT 130 MM HG: CPT | Mod: S$GLB,,, | Performed by: STUDENT IN AN ORGANIZED HEALTH CARE EDUCATION/TRAINING PROGRAM

## 2023-10-24 PROCEDURE — 4010F PR ACE/ARB THEARPY RXD/TAKEN: ICD-10-PCS | Mod: S$GLB,,, | Performed by: STUDENT IN AN ORGANIZED HEALTH CARE EDUCATION/TRAINING PROGRAM

## 2023-10-24 PROCEDURE — 94060 PR EVAL OF BRONCHOSPASM: ICD-10-PCS | Mod: 26,,, | Performed by: STUDENT IN AN ORGANIZED HEALTH CARE EDUCATION/TRAINING PROGRAM

## 2023-10-24 PROCEDURE — 4010F ACE/ARB THERAPY RXD/TAKEN: CPT | Mod: S$GLB,,, | Performed by: STUDENT IN AN ORGANIZED HEALTH CARE EDUCATION/TRAINING PROGRAM

## 2023-10-24 PROCEDURE — 94729 DIFFUSING CAPACITY: CPT | Mod: 26,,, | Performed by: STUDENT IN AN ORGANIZED HEALTH CARE EDUCATION/TRAINING PROGRAM

## 2023-10-24 PROCEDURE — 27100098 HC SPACER

## 2023-10-24 PROCEDURE — 94726 PLETHYSMOGRAPHY LUNG VOLUMES: CPT | Mod: 26,,, | Performed by: STUDENT IN AN ORGANIZED HEALTH CARE EDUCATION/TRAINING PROGRAM

## 2023-10-24 PROCEDURE — 99215 OFFICE O/P EST HI 40 MIN: CPT | Mod: PBBFAC,25 | Performed by: STUDENT IN AN ORGANIZED HEALTH CARE EDUCATION/TRAINING PROGRAM

## 2023-10-24 PROCEDURE — 94726 PLETHYSMOGRAPHY LUNG VOLUMES: CPT

## 2023-10-24 PROCEDURE — 1159F PR MEDICATION LIST DOCUMENTED IN MEDICAL RECORD: ICD-10-PCS | Mod: S$GLB,,, | Performed by: STUDENT IN AN ORGANIZED HEALTH CARE EDUCATION/TRAINING PROGRAM

## 2023-10-24 PROCEDURE — 94060 EVALUATION OF WHEEZING: CPT | Mod: 26,,, | Performed by: STUDENT IN AN ORGANIZED HEALTH CARE EDUCATION/TRAINING PROGRAM

## 2023-10-24 PROCEDURE — 94060 EVALUATION OF WHEEZING: CPT

## 2023-10-24 PROCEDURE — 94726 PULM FUNCT TST PLETHYSMOGRAP: ICD-10-PCS | Mod: 26,,, | Performed by: STUDENT IN AN ORGANIZED HEALTH CARE EDUCATION/TRAINING PROGRAM

## 2023-10-24 PROCEDURE — 94729 DIFFUSING CAPACITY: CPT

## 2023-10-24 PROCEDURE — 3078F DIAST BP <80 MM HG: CPT | Mod: S$GLB,,, | Performed by: STUDENT IN AN ORGANIZED HEALTH CARE EDUCATION/TRAINING PROGRAM

## 2023-10-24 PROCEDURE — 3078F PR MOST RECENT DIASTOLIC BLOOD PRESSURE < 80 MM HG: ICD-10-PCS | Mod: S$GLB,,, | Performed by: STUDENT IN AN ORGANIZED HEALTH CARE EDUCATION/TRAINING PROGRAM

## 2023-10-24 PROCEDURE — 99213 PR OFFICE/OUTPT VISIT, EST, LEVL III, 20-29 MIN: ICD-10-PCS | Mod: 25,S$GLB,, | Performed by: STUDENT IN AN ORGANIZED HEALTH CARE EDUCATION/TRAINING PROGRAM

## 2023-10-24 PROCEDURE — 3074F PR MOST RECENT SYSTOLIC BLOOD PRESSURE < 130 MM HG: ICD-10-PCS | Mod: S$GLB,,, | Performed by: STUDENT IN AN ORGANIZED HEALTH CARE EDUCATION/TRAINING PROGRAM

## 2023-10-24 NOTE — ASSESSMENT & PLAN NOTE
Bronchoscopy with airway cobblestoning and LN TBNA with non caseating granulomas. Adriel is planned for ICI therapy with pembrolizumab for RCC. Known pneumotoxic SEs including pneumonitis, OP and sarcoid like reactions. Plan for management as follows:  - discussed with patient course of sarcoidosis, it is possible for her to have spontaneous remission (Scading class I/II) and to watch for slowed heart rate  - obtain PFT for baseline lung function and screen for reactive airway disease this week prior to initiation of ICI therapy  - obtain TTE to screen for pulmonary hypertension  - will need annual CMP (liver and renal function eval), CBC, EKG and eye exam   -- labs ordered to be obtained in 1 year; CMP and CBC from 10/2023 reviewed   -- referral placed for ophthalmology  - no indication for systemic steroid therapy at this time; exertional dyspnea most likely 2/2 reactive airway disease vs deconditioning

## 2023-10-24 NOTE — PROCEDURES
PFT REPORT:  SPIROMETRY:  The pre-BD FVC is normal, 3.20L/0.29 Z score.  The pre-BD FEV1 is normal, 2.57L/0.47 Z score.  Thre pre-BD FEV1/FVC ratio is 80/0.14 Z score.    The post-BD FVC is normal, 3.16L/0.21 Z score.  The post-BD FEV1 is normal, 2.59L/0.53 Z score.  The post-BD FEV1/FVC ratio is 82/0.44 Z score.    The is no significant bronchodilator effect.    The flow volume loop is normal.    LUNG VOLUMES:  The TLC is normal, 4.88L/-0.30 Z score.  The FRC is normal, 2.64L/-0.37 Z score.  The RV is normal, 1.57L/-1.08 Z score.    DIFFUSION CAPACITY:  The diffusion capacity is not corrected for hemoglobin and is normal, 15.80/-1.40 Z score.    Interpretation:  Spirometry is normal.  The flow volume loop is normal.  Lung volumes are normal.  There is no diffusion defect.   Normal PFTs.     Kathy Lopez MD  Pulmonary Medicine  [facility]

## 2023-10-24 NOTE — TELEPHONE ENCOUNTER
I called patient to inform her of her upcoming appointment with Optomologist  per 's referral request. S he states she will be able to attend her appointment.

## 2023-10-24 NOTE — PROGRESS NOTES
[facility]  Pulmonology  ESTABLISHED VISIT     Patient Name:  Xochilt Morel  Primary Care Provider: Levi Shook DO  Date of Service: 10/24/2023      Chief Complaint: shortness of breath with exertion    SUBJECTIVE   HPI:  Xochilt Morel is a 62 y.o. female with RCC (L kidney) s/p resection 2023 and now newly diagnosed sarcoidosis s/p EBUS 10/09 who presents today for follow up evaluation with complaints of shortness of breath with exertion.     Adriel reports feeling well on this evaluation. She has noted shortness of breath with exertion that is relieved with rest. She has no cough. She is planned for Keytruda therapy this coming Thursday.      Initial HPI 10/02:  Adriel reports feeling well on this evaluation. She was recently diagnosed with renal cell cancer s/p resection. She has had no respiratory symptoms and is a lifelong non smoker. Her post-operative course was uncomplicated and she was able to return to work after 4 days post procedure. She denies fever, chills or cough. She has had TB screening with T spot testing (skin test) that has been negative.     Past Medical History:   Diagnosis Date    Essential (primary) hypertension     H/O right mastectomy 2011    High cholesterol     History of kidney cancer     removed 23    Renal cell carcinoma of left kidney 10/2/2023    Sarcoidosis of lung 10/12/2023    Biopsy proven. S/p EBUS 10/09/2023.       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       Review of patient's allergies indicates:   Allergen Reactions    Sulfa (sulfonamide antibiotics)      Other reaction(s): Rash, Itchy, Unknown        Medications: Medications reviewed to include over the counter medications.    Review of Systems: A 10 point ROS was completed and found to be negative except for that mentioned above.    OBJECTIVE   PHYSICAL EXAM:  Vitals:    10/24/23 0809   BP: 128/76   BP Location: Left arm   Patient Position: Sitting   BP Method: Large (Manual)   Pulse: 85   Resp: 16   SpO2: 97%   Weight: 90.7 kg (200 lb)   Height: 5' 4" (1.626 m)          GENERAL: NAD  HEENT: normocephalic, non-icteric conjunctivae, moist oral mucosa  RESPIRATORY: clear to auscultation, no wheezing, rales or rhonchi  CARDIOVASCULAR: Regular rate and rhythm, no murmurs rubs or gallops.  SKIN: no rash, jaundice, ecchymosis or ulcers  NEUROLOGIC: AO ×3, no gross deficits  PSYCH: Normal mood and affect    LABS:        IMAGING:  Imaging reviewed and notable for CT Chest with hilar and mediastinal lymphadenopathy, scattered sub-centimeter pulmonary nodules       EKG:  10/2023:   Vent. Rate : 085 BPM     Atrial Rate : 085 BPM      P-R Int : 178 ms          QRS Dur : 090 ms       QT Int : 360 ms       P-R-T Axes : 047 -47 046 degrees      QTc Int : 428 ms   Normal sinus rhythm   Left anterior fascicular block     PATHOLOGY:  Final Diagnosis   A. "11R" lymph node, transbronchial needle aspirate:  Blood, benign lymphoid tissue, benign bronchial mucosa/epithelium, and noncaseating granulomas (see comments)     B. "4R" lymph node, transbronchial needle aspirate:  Blood, benign lymphoid tissue, benign bronchial epithelium, and noncaseating granulomas (see comments)     C. "7" lymph node, transbronchial needle aspirate:  Blood and benign lymphoid tissue       LUNG FUNCTION TESTING:  None available to review " or report          ASSESSMENT & PLAN     1. Sarcoidosis  -     Complete PFT w/ bronchodilator; Future  -     Hepatic Function Panel; Future; Expected date: 10/01/2024  -     Ambulatory referral/consult to Ophthalmology; Future; Expected date: 10/31/2023  -     Echo; Future    2. Sarcoidosis of lung  Overview:  Biopsy proven. S/p EBUS 10/09/2023.    Assessment & Plan:  Bronchoscopy with airway cobblestoning and LN TBNA with non caseating granulomas. Adreil is planned for ICI therapy with pembrolizumab for RCC. Known pneumotoxic SEs including pneumonitis, OP and sarcoid like reactions. Plan for management as follows:  - discussed with patient course of sarcoidosis, it is possible for her to have spontaneous remission (Scading class I/II) and to watch for slowed heart rate  - obtain PFT for baseline lung function and screen for reactive airway disease this week prior to initiation of ICI therapy  - obtain TTE to screen for pulmonary hypertension  - will need annual CMP (liver and renal function eval), CBC, EKG and eye exam   -- labs ordered to be obtained in 1 year; CMP and CBC from 10/2023 reviewed   -- referral placed for ophthalmology  - no indication for systemic steroid therapy at this time; exertional dyspnea most likely 2/2 reactive airway disease vs deconditioning    Orders:  -     Complete PFT w/ bronchodilator; Future  -     Hepatic Function Panel; Future; Expected date: 10/01/2024  -     Ambulatory referral/consult to Ophthalmology; Future; Expected date: 10/31/2023  -     Echo; Future           Follow up in about 1 year (around 10/24/2024) for PFT RESULTS.      Case was discussed with patient; all questions were answered to patient's satisfaction and patient verbalized understanding.     Kathy Lopez MD  Pulmonary Medicine  [facility]  Phone: [phone]

## 2023-11-03 ENCOUNTER — HOSPITAL ENCOUNTER (OUTPATIENT)
Dept: CARDIOLOGY | Facility: HOSPITAL | Age: 63
Discharge: HOME | End: 2023-11-03
Attending: STUDENT IN AN ORGANIZED HEALTH CARE EDUCATION/TRAINING PROGRAM
Payer: COMMERCIAL

## 2023-11-03 VITALS — HEIGHT: 64 IN | WEIGHT: 200 LBS | BODY MASS INDEX: 34.15 KG/M2

## 2023-11-03 DIAGNOSIS — D86.0 SARCOIDOSIS OF LUNG: ICD-10-CM

## 2023-11-03 DIAGNOSIS — D86.9 SARCOIDOSIS: ICD-10-CM

## 2023-11-03 PROCEDURE — 93306 TTE W/DOPPLER COMPLETE: CPT | Mod: 26,,, | Performed by: INTERNAL MEDICINE

## 2023-11-03 PROCEDURE — 93306 TTE W/DOPPLER COMPLETE: CPT

## 2023-11-03 PROCEDURE — 93306 ECHO (CUPID ONLY): ICD-10-PCS | Mod: 26,,, | Performed by: INTERNAL MEDICINE

## 2023-11-05 LAB
AORTIC ROOT ANNULUS: 3.43 CM
AV INDEX (PROSTH): 0.8
AV MEAN GRADIENT: 3 MMHG
AV PEAK GRADIENT: 6 MMHG
AV VALVE AREA BY VELOCITY RATIO: 2.94 CM²
AV VALVE AREA: 2.63 CM²
AV VELOCITY RATIO: 0.89
BSA FOR ECHO PROCEDURE: 2.02 M2
CV ECHO LV RWT: 0.48 CM
DOP CALC AO PEAK VEL: 1.18 M/S
DOP CALC AO VTI: 25.7 CM
DOP CALC LVOT AREA: 3.3 CM2
DOP CALC LVOT DIAMETER: 2.05 CM
DOP CALC LVOT PEAK VEL: 1.05 M/S
DOP CALC LVOT STROKE VOLUME: 67.63 CM3
DOP CALCLVOT PEAK VEL VTI: 20.5 CM
E/A RATIO: 0.86
E/E' RATIO: 13.25 M/S
ECHO LV POSTERIOR WALL: 0.87 CM (ref 0.6–1.1)
EJECTION FRACTION: 52 %
FRACTIONAL SHORTENING: 12 % (ref 28–44)
INTERVENTRICULAR SEPTUM: 1.03 CM (ref 0.6–1.1)
IVC DIAMETER: 1.29 CM
LEFT ATRIUM SIZE: 3.7 CM
LEFT INTERNAL DIMENSION IN SYSTOLE: 3.17 CM (ref 2.1–4)
LEFT VENTRICLE DIASTOLIC VOLUME INDEX: 28.18 ML/M2
LEFT VENTRICLE DIASTOLIC VOLUME: 55.23 ML
LEFT VENTRICLE MASS INDEX: 52 G/M2
LEFT VENTRICLE SYSTOLIC VOLUME INDEX: 20.4 ML/M2
LEFT VENTRICLE SYSTOLIC VOLUME: 39.96 ML
LEFT VENTRICULAR INTERNAL DIMENSION IN DIASTOLE: 3.62 CM (ref 3.5–6)
LEFT VENTRICULAR MASS: 101.07 G
LV LATERAL E/E' RATIO: 13.25 M/S
LV SEPTAL E/E' RATIO: 13.25 M/S
LVOT MG: 3.04 MMHG
LVOT MV: 0.85 CM/S
MV PEAK A VEL: 1.23 M/S
MV PEAK E VEL: 1.06 M/S
PISA TR MAX VEL: 2.22 M/S
PV PEAK GRADIENT: 7 MMHG
PV PEAK VELOCITY: 1.28 M/S
RA PRESSURE ESTIMATED: 3 MMHG
RIGHT ATRIAL AREA: 14 CM2
RIGHT VENTRICULAR END-DIASTOLIC DIMENSION: 2.9 CM
RV MID DIAMA: 2.78 CM
RV TB RVSP: 5 MMHG
TDI LATERAL: 0.08 M/S
TDI SEPTAL: 0.08 M/S
TDI: 0.08 M/S
TR MAX PG: 20 MMHG
TRICUSPID ANNULAR PLANE SYSTOLIC EXCURSION: 2.56 CM
TV REST PULMONARY ARTERY PRESSURE: 23 MMHG
Z-SCORE OF LEFT VENTRICULAR DIMENSION IN END DIASTOLE: -4.38
Z-SCORE OF LEFT VENTRICULAR DIMENSION IN END SYSTOLE: -0.67

## 2023-11-07 ENCOUNTER — PATIENT MESSAGE (OUTPATIENT)
Dept: PULMONOLOGY | Facility: CLINIC | Age: 63
End: 2023-11-07
Payer: COMMERCIAL

## 2023-11-28 LAB
CULTURE, FUNGUS (OTHER): ABNORMAL
CULTURE, FUNGUS (OTHER): ABNORMAL
FUNGUS ISLT: ABNORMAL

## 2023-12-04 LAB — MYCOBACTERIUM SPEC CULT: NORMAL

## 2024-01-16 ENCOUNTER — TELEPHONE (OUTPATIENT)
Dept: PULMONOLOGY | Facility: CLINIC | Age: 64
End: 2024-01-16
Payer: COMMERCIAL

## 2024-01-16 NOTE — TELEPHONE ENCOUNTER
Patient called voiced PFT appt for 1/18/24 will no longer work. Patient requested an appt for after 2/12/24. Patient scheduled for 2/13/24 @8:00am. No other questions or concerns at this time.

## 2024-02-13 ENCOUNTER — CLINICAL SUPPORT (OUTPATIENT)
Dept: PULMONOLOGY | Facility: HOSPITAL | Age: 64
End: 2024-02-13
Attending: STUDENT IN AN ORGANIZED HEALTH CARE EDUCATION/TRAINING PROGRAM
Payer: COMMERCIAL

## 2024-02-13 DIAGNOSIS — Z91.89: ICD-10-CM

## 2024-02-13 PROCEDURE — 94729 DIFFUSING CAPACITY: CPT | Mod: 26,,, | Performed by: STUDENT IN AN ORGANIZED HEALTH CARE EDUCATION/TRAINING PROGRAM

## 2024-02-13 PROCEDURE — 94726 PLETHYSMOGRAPHY LUNG VOLUMES: CPT | Mod: 26,,, | Performed by: STUDENT IN AN ORGANIZED HEALTH CARE EDUCATION/TRAINING PROGRAM

## 2024-02-13 PROCEDURE — 94060 EVALUATION OF WHEEZING: CPT

## 2024-02-13 PROCEDURE — 94726 PLETHYSMOGRAPHY LUNG VOLUMES: CPT

## 2024-02-13 PROCEDURE — 94060 EVALUATION OF WHEEZING: CPT | Mod: 26,,, | Performed by: STUDENT IN AN ORGANIZED HEALTH CARE EDUCATION/TRAINING PROGRAM

## 2024-02-13 PROCEDURE — 27100098 HC SPACER

## 2024-02-13 PROCEDURE — 94729 DIFFUSING CAPACITY: CPT

## 2024-02-15 NOTE — PROCEDURES
PFT REPORT:  SPIROMETRY:  The pre-BD FVC is normal, 3.28L/0.47 Z score.  The pre-BD FEV1 is normal, 2.65L/0.72 Z score.  Thre pre-BD FEV1/FVC ratio is 81/0.29 Z score.    The post-BD FVC is normal, 3.24L/0.39 Z score.  The post-BD FEV1 is normal, 2.66L/0.75 Z score.  The post-BD FEV1/FVC ratio is 82/0.44 Z score.    The is no significant bronchodilator effect.  There is an inspiratory limb plateau.    LUNG VOLUMES:  The TLC is normal, 4.27L/-1.23 Z score.  The FRC is normal, 1.92L/-1.51 Z score.  The RV is decreased, 0.92L/-2.45 Z score.    DIFFUSION CAPACITY:  The diffusion capacity is not corrected for hemoglobin and is normal, 16.24/-1.23 Z score.    Interpretation:  Spirometry is normal. The post-BD spirometry shows no obstructive defect. Values are stable from last assessment 10/2023.  There is no significant response to bronchodilators. Lack of a bronchodilator response in the lab does not predict clinical response to bronchodilator therapy.  The presence of an inspiratory limb plateau in the flow volume loop may be consistent with a variable extrathoracic airways obstruction or poor patient effort. Clinical correlation advised.  There is no restrictive defect. Isolated reduction in residual volume can be seen in obesity, poor effort and neuromuscular disease. Clinical correlation is advised.  There is a significant change in lung volumes which may be related to effort. Clinical correlation is advised.  There is no diffusion defect in uncorrected DLCO. The absolute value is improved from last assessment 10/2023.    Kathy Lopez MD  Pulmonary Medicine  [facility]

## 2024-02-16 ENCOUNTER — PATIENT MESSAGE (OUTPATIENT)
Dept: PULMONOLOGY | Facility: CLINIC | Age: 64
End: 2024-02-16
Payer: COMMERCIAL

## 2024-02-16 NOTE — TELEPHONE ENCOUNTER
Discussed results of PFTs. She has no respiratory symptoms. Had imaging done in December or January. Approves for request of imaging records. Will obtain via [employer].     Kathy Lopez MD  Pulmonary and Critical Care  [facility]

## 2024-10-28 ENCOUNTER — OFFICE VISIT (OUTPATIENT)
Dept: PULMONOLOGY | Facility: CLINIC | Age: 64
End: 2024-10-28
Payer: COMMERCIAL

## 2024-10-28 VITALS
RESPIRATION RATE: 18 BRPM | WEIGHT: 200 LBS | SYSTOLIC BLOOD PRESSURE: 112 MMHG | DIASTOLIC BLOOD PRESSURE: 72 MMHG | BODY MASS INDEX: 34.15 KG/M2 | HEIGHT: 64 IN | HEART RATE: 100 BPM | OXYGEN SATURATION: 97 %

## 2024-10-28 DIAGNOSIS — D86.9 SARCOIDOSIS: Primary | ICD-10-CM

## 2024-10-28 PROCEDURE — 99214 OFFICE O/P EST MOD 30 MIN: CPT | Mod: S$GLB,,, | Performed by: STUDENT IN AN ORGANIZED HEALTH CARE EDUCATION/TRAINING PROGRAM

## 2024-10-28 PROCEDURE — 99999 PR PBB SHADOW E&M-EST. PATIENT-LVL IV: CPT | Mod: PBBFAC,,, | Performed by: STUDENT IN AN ORGANIZED HEALTH CARE EDUCATION/TRAINING PROGRAM

## 2024-10-28 PROCEDURE — 1159F MED LIST DOCD IN RCRD: CPT | Mod: S$GLB,,, | Performed by: STUDENT IN AN ORGANIZED HEALTH CARE EDUCATION/TRAINING PROGRAM

## 2024-10-28 PROCEDURE — 3008F BODY MASS INDEX DOCD: CPT | Mod: S$GLB,,, | Performed by: STUDENT IN AN ORGANIZED HEALTH CARE EDUCATION/TRAINING PROGRAM

## 2024-10-28 PROCEDURE — 3078F DIAST BP <80 MM HG: CPT | Mod: S$GLB,,, | Performed by: STUDENT IN AN ORGANIZED HEALTH CARE EDUCATION/TRAINING PROGRAM

## 2024-10-28 PROCEDURE — 1160F RVW MEDS BY RX/DR IN RCRD: CPT | Mod: S$GLB,,, | Performed by: STUDENT IN AN ORGANIZED HEALTH CARE EDUCATION/TRAINING PROGRAM

## 2024-10-28 PROCEDURE — 3074F SYST BP LT 130 MM HG: CPT | Mod: S$GLB,,, | Performed by: STUDENT IN AN ORGANIZED HEALTH CARE EDUCATION/TRAINING PROGRAM

## 2024-10-28 PROCEDURE — 4010F ACE/ARB THERAPY RXD/TAKEN: CPT | Mod: S$GLB,,, | Performed by: STUDENT IN AN ORGANIZED HEALTH CARE EDUCATION/TRAINING PROGRAM

## 2024-10-29 PROBLEM — D86.9 SARCOIDOSIS: Status: ACTIVE | Noted: 2023-10-12

## 2024-11-01 ENCOUNTER — PATIENT MESSAGE (OUTPATIENT)
Dept: PULMONOLOGY | Facility: CLINIC | Age: 64
End: 2024-11-01
Payer: COMMERCIAL

## 2025-03-07 ENCOUNTER — CLINICAL SUPPORT (OUTPATIENT)
Dept: PULMONOLOGY | Facility: HOSPITAL | Age: 65
End: 2025-03-07
Attending: STUDENT IN AN ORGANIZED HEALTH CARE EDUCATION/TRAINING PROGRAM
Payer: COMMERCIAL

## 2025-03-07 DIAGNOSIS — D86.9 SARCOIDOSIS: ICD-10-CM

## 2025-03-07 PROCEDURE — 27100098 HC SPACER

## 2025-03-07 PROCEDURE — 94060 EVALUATION OF WHEEZING: CPT

## 2025-03-07 PROCEDURE — 94727 GAS DIL/WSHOT DETER LNG VOL: CPT

## 2025-03-07 PROCEDURE — 94726 PLETHYSMOGRAPHY LUNG VOLUMES: CPT

## 2025-03-07 PROCEDURE — 94729 DIFFUSING CAPACITY: CPT

## 2025-03-13 ENCOUNTER — PATIENT MESSAGE (OUTPATIENT)
Dept: PULMONOLOGY | Facility: CLINIC | Age: 65
End: 2025-03-13
Payer: COMMERCIAL